# Patient Record
Sex: MALE | Race: WHITE | Employment: OTHER | URBAN - METROPOLITAN AREA
[De-identification: names, ages, dates, MRNs, and addresses within clinical notes are randomized per-mention and may not be internally consistent; named-entity substitution may affect disease eponyms.]

---

## 2017-12-12 ENCOUNTER — OFFICE VISIT (OUTPATIENT)
Dept: INFECTIOUS DISEASES | Age: 67
End: 2017-12-12
Payer: COMMERCIAL

## 2017-12-12 VITALS
RESPIRATION RATE: 16 BRPM | OXYGEN SATURATION: 96 % | HEART RATE: 95 BPM | HEIGHT: 67 IN | BODY MASS INDEX: 25.11 KG/M2 | WEIGHT: 160 LBS | DIASTOLIC BLOOD PRESSURE: 85 MMHG | SYSTOLIC BLOOD PRESSURE: 128 MMHG | TEMPERATURE: 98.3 F

## 2017-12-12 DIAGNOSIS — L08.9 LEFT FOOT INFECTION: ICD-10-CM

## 2017-12-12 DIAGNOSIS — L02.91 ABSCESS: Primary | ICD-10-CM

## 2017-12-12 PROCEDURE — 99214 OFFICE O/P EST MOD 30 MIN: CPT | Performed by: INTERNAL MEDICINE

## 2017-12-12 RX ORDER — ALBUTEROL SULFATE 90 UG/1
2 AEROSOL, METERED RESPIRATORY (INHALATION) EVERY 6 HOURS PRN
COMMUNITY

## 2017-12-12 RX ORDER — HYDROXYCHLOROQUINE SULFATE 200 MG/1
TABLET, FILM COATED ORAL DAILY
COMMUNITY

## 2017-12-12 RX ORDER — LISINOPRIL 20 MG/1
20 TABLET ORAL DAILY
COMMUNITY

## 2017-12-12 RX ORDER — PROCHLORPERAZINE MALEATE 5 MG/1
5 TABLET ORAL EVERY 6 HOURS PRN
COMMUNITY

## 2017-12-12 RX ORDER — SPIRONOLACTONE 50 MG/1
50 TABLET, FILM COATED ORAL DAILY
COMMUNITY

## 2017-12-12 RX ORDER — FERROUS SULFATE 325(65) MG
325 TABLET ORAL
COMMUNITY

## 2017-12-12 RX ORDER — ROPINIROLE 0.25 MG/1
0.25 TABLET, FILM COATED ORAL 3 TIMES DAILY
COMMUNITY

## 2017-12-12 RX ORDER — AMITRIPTYLINE HYDROCHLORIDE 25 MG/1
25 TABLET, FILM COATED ORAL NIGHTLY
COMMUNITY

## 2017-12-12 RX ORDER — ATORVASTATIN CALCIUM 40 MG/1
40 TABLET, FILM COATED ORAL DAILY
COMMUNITY

## 2017-12-12 RX ORDER — FENOFIBRATE 145 MG/1
145 TABLET, COATED ORAL DAILY
COMMUNITY

## 2017-12-12 RX ORDER — CLOPIDOGREL BISULFATE 75 MG/1
75 TABLET ORAL DAILY
COMMUNITY

## 2017-12-12 RX ORDER — GABAPENTIN 300 MG/1
300 CAPSULE ORAL 3 TIMES DAILY
COMMUNITY

## 2017-12-12 RX ORDER — MORPHINE SULFATE 30 MG/1
30 TABLET ORAL EVERY 4 HOURS PRN
COMMUNITY

## 2017-12-12 RX ORDER — ESCITALOPRAM OXALATE 20 MG/1
20 TABLET ORAL DAILY
COMMUNITY

## 2017-12-12 RX ORDER — METOPROLOL SUCCINATE 25 MG/1
25 TABLET, EXTENDED RELEASE ORAL DAILY
COMMUNITY

## 2017-12-12 RX ORDER — LEVETIRACETAM 500 MG/1
500 TABLET ORAL 2 TIMES DAILY
COMMUNITY

## 2017-12-12 RX ORDER — POTASSIUM CHLORIDE 750 MG/1
10 TABLET, FILM COATED, EXTENDED RELEASE ORAL DAILY
COMMUNITY

## 2017-12-12 RX ORDER — OMEPRAZOLE 20 MG/1
20 CAPSULE, DELAYED RELEASE ORAL DAILY
COMMUNITY

## 2017-12-12 RX ORDER — LOPERAMIDE HYDROCHLORIDE 2 MG/1
2 CAPSULE ORAL 4 TIMES DAILY PRN
COMMUNITY

## 2017-12-12 RX ORDER — FUROSEMIDE 40 MG/1
40 TABLET ORAL 2 TIMES DAILY
COMMUNITY

## 2017-12-12 RX ORDER — CYCLOBENZAPRINE HCL 10 MG
10 TABLET ORAL 3 TIMES DAILY PRN
COMMUNITY

## 2017-12-12 ASSESSMENT — ENCOUNTER SYMPTOMS
COUGH: 0
NAUSEA: 0
EYE DISCHARGE: 0
TROUBLE SWALLOWING: 0
ABDOMINAL DISTENTION: 0
VOICE CHANGE: 0
SORE THROAT: 0
ABDOMINAL PAIN: 0
DIARRHEA: 0
BACK PAIN: 0
SHORTNESS OF BREATH: 0
FACIAL SWELLING: 0
RHINORRHEA: 0

## 2017-12-12 NOTE — LETTER
the abdomen pelvis also showed large gas containing irregular rim enhancing fluid collection posterior to the thecal sac and extending posteriorly the just beneath the skin surface measuring 12.4 x 6.9 x 11 cm.     Patient continued to have persistent lumbar CSF leak hands placement of right-sided [facility] shunt and drainage of the lumbar wound was accomplished on July 12, 2017. The ascitic fluid aspirate on July 12 came back negative.  The lumbar fluid came back with Staphylococcus aureus.  I was called about this patient because of this infection. We placed him on intravenous vancomycin. The plan was to treat him with a 6 week course of intravenous vancomycin through a PICC line.     While at rehab, the patient started developing headache a new onset left-sided weakness. The CT scan of the brain was done which showed the following findings:     Impression: Postoperative findings seen, still a large amount of edema and mass effect centered in the right cerebral hemisphere with midline shift as described above.  Postoperative changes are identified.  I do not see evidence of an acute intraparenchymal hemorrhage or hematoma.  An presumably  the edema and mass effect in the right cerebral hemisphere relates to an acute infarction.  An underlying mass seems much less likely given the essentially normal appearance of this area on the study from July 10. The patient underwent emergent right temporal craniotomy and evacuation of intracranial abscess and removal at the [facility] shunt on July 26. The cultures came back negative. He finished with a 4 weeks course of  empiric antibiotics. The patient has been to our office a few times. He has a benign fluid collection in his lumbar area. We have sent the imaging to [facility] office at some point and he was seen by him on 11/2015. The patient is improving and making strides with therapy. His back is not bothering him as much. He has no headaches, vision changes, nausea, or abdominal pain. He has been on chronic clindamycin for his right foot care [facility]. His hardware was taken out the same year. Subjective:     Review of Systems   Constitutional: Negative for activity change, chills and fever. HENT: Negative for congestion, facial swelling, hearing loss, rhinorrhea, sore throat, trouble swallowing and voice change. Eyes: Negative for discharge and visual disturbance. Respiratory: Negative for cough and shortness of breath. Cardiovascular: Negative for chest pain and leg swelling. Gastrointestinal: Negative for abdominal distention, abdominal pain, diarrhea and nausea. Genitourinary: Negative for dysuria, frequency and hematuria. Musculoskeletal: Negative for arthralgias, back pain, gait problem, joint swelling, myalgias, neck pain and neck stiffness. Allergic/Immunologic: Negative for environmental allergies and food allergies. Neurological: Positive for numbness. Negative for dizziness, weakness and headaches. Hematological: Does not bruise/bleed easily.          Current Outpatient Prescriptions:     prochlorperazine (COMPAZINE) 5 MG tablet, Take 5 mg by mouth every 6 hours as needed for Nausea, Disp: , Rfl:     albuterol sulfate  (90 Base) MCG/ACT inhaler, Inhale 2 puffs into the lungs every 6 hours as needed for Wheezing, Disp: , Rfl:     omeprazole (PRILOSEC) 20 MG delayed release capsule, Take 20 mg by mouth daily, Disp: , Rfl:     spironolactone (ALDACTONE) 50 MG tablet, Take 50 mg by mouth daily, Disp: , Rfl:     vitamin A 55310 units capsule, Take 10,000 Units by mouth daily, Disp: , Rfl:     rOPINIRole (REQUIP) 0.25 MG tablet, Take 0.25 mg by mouth 3 times daily, Disp: , Rfl:     lisinopril (PRINIVIL;ZESTRIL) 20 MG tablet, Take 20 mg by mouth daily, Disp: , Rfl:     loperamide (IMODIUM) 2 MG capsule, Take 2 mg by mouth 4 times daily as needed for Diarrhea, Disp: , Rfl: Cardiovascular: Normal rate, regular rhythm, normal heart sounds and intact distal pulses. No murmur heard. Pulmonary/Chest: Effort normal and breath sounds normal. No stridor. He has no wheezes. He has no rales. Abdominal: Soft. Bowel sounds are normal. He exhibits no distension. There is no tenderness. Musculoskeletal: He exhibits no edema, tenderness or deformity. Left-sided medial scar without any signs of infection. Lymphadenopathy:     He has no cervical adenopathy. Neurological: He is alert and oriented to person, place, and time. He exhibits normal muscle tone. He is ambulatory. Skin: No rash noted. He is not diaphoretic. No erythema. Psychiatric: His behavior is normal. Judgment and thought content normal.         Data Review:   December 7, 2017  White count 3.6 hemoglobin 12.6 hematocrit 37.1, platelet count 83  Glucose 86 blood urea nitrogen 83 creatinine 1.6 sodium 137 potassium 4.5 chloride 103 bicarbonate 26. Total protein 6 albumin 3.4. Bilirubin 0.4 AST 29 ALT 25 alkaline phosphatase 94    MICRO: None new. IMAGING: None new. Thank you. Robley Lesch, MD  12/12/2017  4:14 PM    Infectious Disease Medicine    If you have questions, please do not hesitate to call me. I look forward to following Gregory Earthly along with you.     Sincerely,    Talmage Grit, MD Robley Lesch, MD

## 2017-12-12 NOTE — PROGRESS NOTES
[facility] INFECTIOUS DISEASES  Follow-Up Note      Patient's Name: Pamela Sandra  YOB: 1950  Age/Sex: 74 y.o./ male  Physician: Serena Lobo MD  Date of Consult: 12/12/2017     Gretta Corral MD; Piyush Munguia MD; Ronald Young MD     Assessment:     ·   · S/P hematoma drainage (intracranial) ? Abscess. - treated. · Recent postop lumbar infection with MRSA. treated  · Left-sided weakness related to intracranial mass effect -recovering. · Status post bilateral L2-L5 laminectomy with foraminotomies on June 13, 2017 compared [place] by CSF leak with the need for repair of the leak. · Recent lumbar drain on June 23, 2017. · Recent repair of the CSF leak on June 29, 2017. · Reason nausea vomiting and diarrhea with a negative workup and resolution of the gastrointestinal complaints. Finding of mid wall thickening of the ascending and transverse colon. · Status post repair of the CSF leak and placement of a right-sided ventricular atrial shunt on July 12, 2017. · Secondarily infected seroma/CSF leak with MRSA. · Consider liver disease/ascites. · History of left foot infection with MRSA status post I&D care of [facility]. · DM type 2.  · Normocytic anemia. · Mild chronic thrombocytopenia likely related to liver disease.    · History of TIA. · History of JOANA. · History of GERD. · History umbilical and inguinal hernia repair. · History of osteoarthritis and rheumatoid arthritis. · History of hypertension and dyslipidemia. · History of visual and hearing impairment. · History of prostate cancer and prostate surgery. · History of multiple orthopedic procedures as outlined in the surgical    Plan:     · Stay off clindamycin. Foot hardware was taken out 2 years ago from foot. · Advised. · Blood work. · No further antibiotics. F/u of  Brain abscess and lumbar infection  Foot infection left.   Chief Complaint   Patient presents with    Other     MRSA infection Gastrointestinal: Negative for abdominal distention, abdominal pain, diarrhea and nausea. Genitourinary: Negative for dysuria, frequency and hematuria. Musculoskeletal: Negative for arthralgias, back pain, gait problem, joint swelling, myalgias, neck pain and neck stiffness. Allergic/Immunologic: Negative for environmental allergies and food allergies. Neurological: Positive for numbness. Negative for dizziness, weakness and headaches. Hematological: Does not bruise/bleed easily.          Current Outpatient Prescriptions:     prochlorperazine (COMPAZINE) 5 MG tablet, Take 5 mg by mouth every 6 hours as needed for Nausea, Disp: , Rfl:     albuterol sulfate  (90 Base) MCG/ACT inhaler, Inhale 2 puffs into the lungs every 6 hours as needed for Wheezing, Disp: , Rfl:     omeprazole (PRILOSEC) 20 MG delayed release capsule, Take 20 mg by mouth daily, Disp: , Rfl:     spironolactone (ALDACTONE) 50 MG tablet, Take 50 mg by mouth daily, Disp: , Rfl:     vitamin A 13014 units capsule, Take 10,000 Units by mouth daily, Disp: , Rfl:     rOPINIRole (REQUIP) 0.25 MG tablet, Take 0.25 mg by mouth 3 times daily, Disp: , Rfl:     lisinopril (PRINIVIL;ZESTRIL) 20 MG tablet, Take 20 mg by mouth daily, Disp: , Rfl:     loperamide (IMODIUM) 2 MG capsule, Take 2 mg by mouth 4 times daily as needed for Diarrhea, Disp: , Rfl:     metoprolol succinate (TOPROL XL) 25 MG extended release tablet, Take 25 mg by mouth daily, Disp: , Rfl:     morphine (MSIR) 30 MG tablet, Take 30 mg by mouth every 4 hours as needed for Pain ., Disp: , Rfl:     amitriptyline (ELAVIL) 25 MG tablet, Take 25 mg by mouth nightly, Disp: , Rfl:     Multiple Vitamins-Minerals (CENTRUM SILVER 50+MEN) TABS, Take by mouth, Disp: , Rfl:     clopidogrel (PLAVIX) 75 MG tablet, Take 75 mg by mouth daily, Disp: , Rfl:     cyclobenzaprine (FLEXERIL) 10 MG tablet, Take 10 mg by mouth 3 times daily as needed for Muscle spasms, Disp: , Rfl:    fenofibrate (TRICOR) 145 MG tablet, Take 145 mg by mouth daily, Disp: , Rfl:     ferrous sulfate 325 (65 Fe) MG tablet, Take 325 mg by mouth daily (with breakfast), Disp: , Rfl:     furosemide (LASIX) 40 MG tablet, Take 40 mg by mouth 2 times daily, Disp: , Rfl:     gabapentin (NEURONTIN) 300 MG capsule, Take 300 mg by mouth 3 times daily, Disp: , Rfl:     hydroxychloroquine (PLAQUENIL) 200 MG tablet, Take by mouth daily, Disp: , Rfl:     levETIRAcetam (KEPPRA) 500 MG tablet, Take 500 mg by mouth 2 times daily, Disp: , Rfl:     potassium chloride (KLOR-CON 10) 10 MEQ extended release tablet, Take 10 mEq by mouth daily, Disp: , Rfl:     LACTOBACILLUS ACID-PECTIN PO, Take by mouth, Disp: , Rfl:     escitalopram (LEXAPRO) 20 MG tablet, Take 20 mg by mouth daily, Disp: , Rfl:     atorvastatin (LIPITOR) 40 MG tablet, Take 40 mg by mouth daily, Disp: , Rfl:     Past Medical History:   Diagnosis Date    Anemia     CKD (chronic kidney disease)     CSF leak     Diabetes mellitus (HCC)     GERD (gastroesophageal reflux disease)     GERD (gastroesophageal reflux disease)     Hearing loss     Hyperlipidemia     Hypertension     Infection of lumbar spine (HCC)     Inguinal hernia     Intracranial abscess     Kidney disorder     Low back pain     MRSA (methicillin resistant staph aureus) culture positive     MRSA (methicillin resistant staph aureus) culture positive     left foot     Neck pain     Osteoarthritis     Osteoarthritis     Pneumonia     Prostate cancer ([address]     Rheumatoid arthritis ([address]     Sleep apnea     TIA (transient ischemic attack)     Type 2 diabetes mellitus without complication ([address]     Umbilical hernia     Visual impairment        Past Surgical History:   Procedure Laterality Date    ANKLE FUSION      right     CERVICAL LAMINECTOMY      COLONOSCOPY      CSF SHUNT      FOOT SURGERY      left    INGUINAL HERNIA REPAIR      x 2     KNEE SURGERY      right    LUMBAR

## 2024-05-01 PROCEDURE — 93306 TTE W/DOPPLER COMPLETE: CPT | Performed by: INTERNAL MEDICINE

## 2024-10-30 PROCEDURE — 80164 ASSAY DIPROPYLACETIC ACD TOT: CPT

## 2024-10-31 ENCOUNTER — LAB REQUISITION (OUTPATIENT)
Dept: LAB | Facility: HOSPITAL | Age: 74
End: 2024-10-31
Payer: MEDICARE

## 2024-10-31 DIAGNOSIS — I48.91 UNSPECIFIED ATRIAL FIBRILLATION (MULTI): ICD-10-CM

## 2024-10-31 DIAGNOSIS — E11.40 TYPE 2 DIABETES MELLITUS WITH DIABETIC NEUROPATHY, UNSPECIFIED (MULTI): ICD-10-CM

## 2024-10-31 DIAGNOSIS — J44.9 CHRONIC OBSTRUCTIVE PULMONARY DISEASE, UNSPECIFIED: ICD-10-CM

## 2024-10-31 DIAGNOSIS — G40.89 OTHER SEIZURES (MULTI): ICD-10-CM

## 2024-10-31 LAB — VALPROATE SERPL-MCNC: 27 UG/ML (ref 50–100)
